# Patient Record
Sex: FEMALE | Race: WHITE | URBAN - METROPOLITAN AREA
[De-identification: names, ages, dates, MRNs, and addresses within clinical notes are randomized per-mention and may not be internally consistent; named-entity substitution may affect disease eponyms.]

---

## 2022-05-24 ENCOUNTER — HOSPITAL ENCOUNTER (OUTPATIENT)
Dept: PHYSICAL THERAPY | Age: 42
Discharge: HOME | End: 2022-05-24
Payer: COMMERCIAL

## 2022-05-24 PROCEDURE — 97535 SELF CARE MNGMENT TRAINING: CPT

## 2022-05-24 PROCEDURE — 97162 PT EVAL MOD COMPLEX 30 MIN: CPT

## 2022-05-24 PROCEDURE — 97110 THERAPEUTIC EXERCISES: CPT

## 2022-05-24 NOTE — PROGRESS NOTES
[facility] PHYSICAL THERAPY  [address] [place] [address] 51, Chester Orozco 201,[facility], [facility] - Phone: [phone]  Fax: [phone] / [facility]  Patient Name: Avi Rhodes : 1980   Medical   Diagnosis: Pain in right shoulder [M25.511] Treatment Diagnosis: R shoulder pain   Onset Date:      Referral Source: Sam Patel MD Start of Care [facility]): 2022   Prior Hospitalization: See medical history Provider #: [id number]   Prior Level of Function: History of R shoulder pain on and off since 2019   Comorbidities: Anxiety, arthritis, BMI >30, depression   Medications: Verified on Patient Summary List   The Plan of Care and following information is based on the information from the initial evaluation.   ===========================================================================================  Assessment / kaminski information:  Avi Rhodes is a 39 y.o. female with Dx: R shoulder pain starting in  when she was playing softball and noticed pain radiating down the arm; had scope done to \"clean things up\" which helped pain until 2019 until she noticed similar symptoms. Reports she stopped playing softball and the shoulder got better. Recently noticed pain in the shoulder again when her dog pulled her away. Notes pain with sleeping on her R side, leaning on the R side for prolonged periods, and with repetitive overhead exercises. Reports she works out weekly with crossfit and HIIT workouts but hasn't worked out since the recent injury. Denies recent imaging. Denies neck pain or HA. Pt rates pain as 6/10 max, 0/10 at best, 1/10 currently. Occupation: tech support  Objective: FOTO score = 57 (an established functional score where 100 = no disability). Posture: forward head, rounded shoulders, thoracic kyphosis. Palpation TTP along R biceps and bicipital groove, R levator, R UT, R posterior cuff.  Shoulder ROM Flexion R 153 (165 with pain) L 172, ABD R 180 L 180, Functional IR R T8 L T5, Functional ER R T6 L T6. Strength: Shoulder Flexion R 5/5 L 5/5, ABD R 4/5 with pain L 5/5, ER R 4-/5 with pain L 5/5, IR R 5/5 with pain L 5/5. Special Tests: (+) Empty can, (-) [employer], (-) biceps load 1 and 2  Current deficits include decreased R shoulder ROM and strength along with poor posture leading to difficulty with prolonged positions and ADLs. Pt will benefit from a comprehensive POC/HEP to address impairments and restore function in order to return to prior level of function and prevent secondary impairments.  ===========================================================================================  Eval Complexity: History HIGH Complexity :3+ comorbidities / personal factors will impact the outcome/ POC ;  Examination  HIGH Complexity : 4+ Standardized tests and measures addressing body structure, function, activity limitation and / or participation in recreation ; Presentation MEDIUM Complexity : Evolving with changing characteristics ;   Decision Making MEDIUM Complexity : FOTO score of 26-74; Overall Complexity MEDIUM  Problem List: pain affecting function, decrease ROM, decrease strength, decrease ADL/ functional abilitiies, decrease activity tolerance, decrease flexibility/ joint mobility, and decrease transfer abilities   Treatment Plan may include any combination of the following: Therapeutic exercise, Therapeutic activities, Neuromuscular re-education, Physical agent/modality, Gait/balance training, Manual therapy, Aquatic therapy, Patient education, Self Care training, Functional mobility training, and Home safety training  Patient / Family readiness to learn indicated by: asking questions, trying to perform skills, and interest  Persons(s) to be included in education: patient (P)  Barriers to Learning/Limitations: None  Measures taken, if barriers to learning:    Patient Goal (s): No surgery   Patient self reported health status: fair  Rehabilitation Potential: good   Short Term Goals: To be accomplished in  3  weeks:  1. Independent with HEP to progress to meet goals. 2. Pt to report decreased max pain levels less than 4/10 for improvement in ADLs.  Long Term Goals: To be accomplished in  6  weeks:  1. Improve FOTO score to 70/100 to show a significant functional change. 2. Pt to report decreased max pain levels less than 2/10 for improvement in QoL. 3. Pt to report return to gym with UE therex to resume PLOF. 4. Pt to report 75% improvement in sleeping to improve QoL. Frequency / Duration:   Patient to be seen  2  times per week for 6  weeks:  Patient / Caregiver education and instruction: self care, activity modification, and exercises  Therapist Signature: Lachelle Nunez PT , DPT Date: 6/99/2008   Certification Period: na Time: 12:50 PM   ===========================================================================================  I certify that the above Physical Therapy Services are being furnished while the patient is under my care. I agree with the treatment plan and certify that this therapy is necessary. Physician Signature:        Date:       Time:                                        Wilberto Thorne MD  Please sign and return to [facility] at [facility] [place], [facility]. or you may fax the signed copy to [phone]. Thank you.

## 2022-05-24 NOTE — PROGRESS NOTES
PHYSICAL THERAPY - DAILY TREATMENT NOTE    Patient Name: Jessica Aviles        Date: 2022  : 1980   yes Patient  Verified  Visit #:   1   of   12  Insurance: Payor: Lisa Meehan / Plan: [address] [place] [address] / Product Type: PPO /      In time: 605 Out time: 648   Total Treatment Time: 43     Medicare/[facility] Time Tracking (below)   Total Timed Codes (min):  25 1:1 Treatment Time:  43     TREATMENT AREA =  Pain in right shoulder [M25.511]    SUBJECTIVE  Pain Level (on 0 to 10 scale):  0  / 10   Medication Changes/New allergies or changes in medical history, any new surgeries or procedures? yes  If yes, update Summary List   Subjective Functional Status/Changes:  []  No changes reported     See POC          OBJECTIVE  12 min Therapeutic Exercise:  [x]  See flow sheet   Rationale:      increase strength and improve coordination to improve the patients ability to perform ADLs. 13 min Self Care: Pt education on HEP, POC, prognosis, and diagnosis. Rationale:    Improve pt understanding to improve the patients ability to progress therapy at home. Billed With/As:   [] TE   [] TA   [] Neuro   [x] Self Care Patient Education: [x] Review HEP    [] Progressed/Changed HEP based on:   [] positioning   [] body mechanics   [] transfers   [] heat/ice application    [] other:      Other Objective/Functional Measures:    Shown and performed HEP    Access Code: [email or web address]  URL: [email or web address]  Date: 2022  Prepared by: Heather General    Exercises  Seated Scapular Retraction - 5 x daily - 7 x weekly - 15 reps - 5sec hold  Prone Scapular Slide with Shoulder Extension - 2 x daily - 7 x weekly - 2 sets - 10 reps  Prone Shoulder Horizontal Abduction with Thumbs Up - 2 x daily - 7 x weekly - 2 sets - 10 reps  Sidelying Shoulder External Rotation - 2 x daily - 7 x weekly - 2 sets - 10 reps  Sidelying Shoulder Abduction Palm Forward - 2 x daily - 7 x weekly - 2 sets - 10 reps Post Treatment Pain Level (on 0 to 10) scale:   0  / 10     ASSESSMENT  Assessment/Changes in Function:     See POC     []  See Progress Note/Recertification   Patient will continue to benefit from skilled PT services to modify and progress therapeutic interventions, address functional mobility deficits, analyze and cue movement patterns, and analyze and modify body mechanics/ergonomics to attain remaining goals.    Progress toward goals / Updated goals:    See POC     PLAN  [x]  Upgrade activities as tolerated yes Continue plan of care   []  Discharge due to :    []  Other:      Therapist: Sreekanth Puga PT , DPT    Date: 5/24/2022 Time: 12:50 PM     Future Appointments   Date Time Provider Danyel Hatch   5/24/2022  6:00 PM Jesús Stone, [address] [facility]

## 2022-06-02 ENCOUNTER — HOSPITAL ENCOUNTER (OUTPATIENT)
Dept: PHYSICAL THERAPY | Age: 42
Discharge: HOME | End: 2022-06-02
Payer: COMMERCIAL

## 2022-06-02 PROCEDURE — 97535 SELF CARE MNGMENT TRAINING: CPT

## 2022-06-02 PROCEDURE — 97110 THERAPEUTIC EXERCISES: CPT

## 2022-06-02 PROCEDURE — 97140 MANUAL THERAPY 1/> REGIONS: CPT

## 2022-06-02 NOTE — PROGRESS NOTES
PHYSICAL THERAPY - DAILY TREATMENT NOTE    Patient Name: Robert Garcia        Date: 2022  : 1980   YES Patient  Verified  Visit #:   2  of   12  Insurance: Payor: Martha Canter / Plan: [address] [place] [address] / Product Type: PPO /      In time: 6:00 Out time: 6:55   Total Treatment Time: 55     Medicare/[employer] Time Tracking (below)   Total Timed Codes (min):  55 1:1 Treatment Time:  55     TREATMENT AREA =  Pain in right shoulder [M25.511]    SUBJECTIVE  Pain Level (on 0 to 10 scale):  2  / 10   Medication Changes/New allergies or changes in medical history, any new surgeries or procedures? NO If yes, update Summary List   Subjective Functional Status/Changes:  []  No changes reported     Says sort of did the HEP. Says she slept on the right shoulder and that made it achy. She can feel the pain down the front of the arm sometimes. She sits \"like a cooked shrimp\" at work so her posture is bad. OBJECTIVE    37 min Therapeutic Exercise:  [x]  See flow sheet   Rationale:      increase ROM, increase strength and improve coordination to improve the patients ability to push, pull, and stabilize the shoulder     8 min Manual Therapy: Light PROM in all planes   Rationale:      decrease pain and increase tissue extensibility to improve patient's ability to tolerate anterior shoulder tension  The manual therapy interventions were performed at a separate and distinct time from the therapeutic activities interventions. 10 min Self Care: HEP expansion, instruction/demo   Rationale:    increase strength to improve the patients ability to make gains btw visits    Billed With/As:   [] TE   [] TA   [] Neuro   [] Self Care Patient Education: [x] Review HEP    [] Progressed/Changed HEP based on:   [] Addressed barriers and behaviors     [] Therapeutic Neuroscience Pain Education, metaphor, reframing, contexts.     [] Sleep Education   [] Body Mechanics [] Healing Timeframe     [] Self STM with ball at [facility] spot\"  [] Global Activity   [] other:      Other Objective/Functional Measures:    See Flowsheet for drills, loads and volume. HEP:  Access Code: [email or web address]  URL: [email or web address]  Date: 06/02/2022  Prepared by: Nick Civil    Exercises  Isometric Shoulder Flexion at Wall - 1 x daily - 7 x weekly - 1 sets - 10 reps - 5 hold  Standing Isometric Shoulder Internal Rotation at Doorway - 1 x daily - 7 x weekly - 1 sets - 10 reps - 5 hold  Isometric Shoulder Extension at Wall - 1 x daily - 7 x weekly - 1 sets - 10 reps - 5 hold  Standing Isometric Shoulder External Rotation with Doorway - 1 x daily - 7 x weekly - 1 sets - 10 reps - 5 hold  Standing Isometric Shoulder Abduction with Doorway - Arm Bent - 1 x daily - 7 x weekly - 1 sets - 10 reps - 5 hold  Isometric Shoulder Adduction - 1 x daily - 7 x weekly - 1 sets - 10 reps - 5 hold       Post Treatment Pain Level (on 0 to 10) scale:   0  / 10     ASSESSMENT  Assessment/Changes in Function:     Chart reviewed and subjective taken. Pt required skilled instruction for initiation, form and follow-through for all drills in session. Pt responds well to instruction and demo. Noted biceps tendon irritation and loose right shoulder and isoms were added to HEP. []  See Progress Note/Recertification   Patient will continue to benefit from skilled PT services to modify and progress therapeutic interventions, address functional mobility deficits, address ROM deficits, address strength deficits, analyze and address soft tissue restrictions, analyze and cue movement patterns, analyze and modify body mechanics/ergonomics and assess and modify postural abnormalities to attain remaining goals. Progress toward goals / Updated goals: · Short Term Goals: To be accomplished in  3  weeks:  1. Independent with HEP to progress to meet goals. Non-compliant to date  2. Pt to report decreased max pain levels less than 4/10 for improvement in ADLs.   · Long Term Goals: To be accomplished in  6  weeks:  1. Improve FOTO score to 70/100 to show a significant functional change. 2. Pt to report decreased max pain levels less than 2/10 for improvement in QoL. 3. Pt to report return to gym with UE therex to resume PLOF. 4. Pt to report 75% improvement in sleeping to improve QoL.      PLAN  [x]  Upgrade activities as tolerated YES Continue plan of care   []  Discharge due to :    []  Other:      Therapist: Luis Flanagan PT    Date: 6/2/2022 Time: 6:48 PM     Future Appointments   Date Time Provider Danyel Hatch   6/13/2022  7:15 AM Garon Alpha,  [address] [facility]   6/21/2022  6:00 PM Nav Royal,  [address] [facility]   6/23/2022  6:00 PM Nav Royal,  [address] [facility]   6/28/2022  6:00 PM Nav Royal,  [address] [facility]   6/30/2022  6:00 PM Nav Royal,  [address] [facility]   7/11/2022  6:00 PM Garon Alpha,  [address] [facility]   7/12/2022  6:00 PM Ayesha Calloway,  [address] [facility]   7/18/2022  6:00 PM Garon Alpha,  [address] [facility]   7/21/2022  6:00 PM Nav Royal,  [address] [facility]   7/25/2022  6:00 PM Garon Alpha,  [address] [facility]   7/28/2022  6:00 PM Nav Royal,  [address] [facility]

## 2022-06-13 ENCOUNTER — HOSPITAL ENCOUNTER (OUTPATIENT)
Dept: PHYSICAL THERAPY | Age: 42
Discharge: HOME | End: 2022-06-13
Payer: COMMERCIAL

## 2022-06-13 PROCEDURE — 97140 MANUAL THERAPY 1/> REGIONS: CPT

## 2022-06-13 PROCEDURE — 97110 THERAPEUTIC EXERCISES: CPT

## 2022-06-13 NOTE — PROGRESS NOTES
PHYSICAL THERAPY - DAILY TREATMENT NOTE    Patient Name: Meghna Butler        Date: 2022  : 1980   yes Patient  Verified  Visit #:   3   of   12  Insurance: Payor: London Ovalle / Plan: [address] [place] [id number] / Product Type: PPO /      In time: 7:30 am Out time: 8:00 am   Total Treatment Time: 30     Medicare/[facility] Time Tracking (below)   Total Timed Codes (min):  30 1:1 Treatment Time:  30     TREATMENT AREA =  Pain in right shoulder [M25.511]    SUBJECTIVE  Pain Level (on 0 to 10 scale):  0  / 10   Medication Changes/New allergies or changes in medical history, any new surgeries or procedures?    no  If yes, update Summary List   Subjective Functional Status/Changes:  []  No changes reported     Patient reports no pain this morning and that she is not a morning person which is why she is late. OBJECTIVE  Modalities Rationale:     decrease inflammation and decrease pain to improve patient's ability to reach, lift and carry items.    min [] Estim, type/location:                                      []  att     []  unatt     []  w/US     []  w/ice    []  w/heat    min []  Mechanical Traction: type/lbs                   []  pro   []  sup   []  int   []  cont    []  before manual    []  after manual    min []  Ultrasound, settings/location:      min []  Iontophoresis w/ dexamethasone, location:                                               []  take home patch       []  in clinic   PD min []  Ice     []  Heat    location/position:     min []  Vasopneumatic Device, press/temp:    If using vaso (only need to measure limb vaso being performed on)      pre-treatment girth :       post-treatment girth :       measured at (landmark location) :      min []  Other:    [x] Skin assessment post-treatment (if applicable):    [x]  intact    []  redness- no adverse reaction                  []redness - adverse reaction:      15 min Therapeutic Exercise:  [x]  See flow sheet   Rationale:      increase ROM and increase strength to improve the patients ability to reach, lift and carry     15 min Manual Therapy: Gentle PROM/stretching   Rationale:      decrease pain, increase ROM and increase tissue extensibility to improve patient's ability to reach, lift and carry  The manual therapy interventions were performed at a separate and distinct time from the therapeutic activities interventions. Billed With/As:   [x] TE   [] TA   [] Neuro   [] Self Care Patient Education: [x] Review HEP    [] Progressed/Changed HEP based on:   [] positioning   [] body mechanics   [] transfers   [] heat/ice application    [] other:      Other Objective/Functional Measures:    Palpation: biceps tendon and pec major tendon moderately TTP with mTrp located in pec major insertion. Ischemic compression provided with gentle passive ROM    Therex and manual therapies per flow sheet. Post Treatment Pain Level (on 0 to 10) scale:   0  / 10     ASSESSMENT  Assessment/Changes in Function:     Patient tolerated today's treatment well. Progressed/added therex as follows: 2# for prone ext and T, s/l ER and ABD. Discussed progressions with patient. Patient is agreeable. Continue to progress as tolerated. []  See Progress Note/Recertification   Patient will continue to benefit from skilled PT services to modify and progress therapeutic interventions, address functional mobility deficits, address ROM deficits, address strength deficits, analyze and address soft tissue restrictions, analyze and cue movement patterns, analyze and modify body mechanics/ergonomics and assess and modify postural abnormalities to attain remaining goals. Progress toward goals / Updated goals:    Patient making good progress towards all goals at this time. Minimal progress in 3 visits. Pain is diminished. All goals remain applicable.      PLAN  [x]  Upgrade activities as tolerated yes Continue plan of care   []  Discharge due to :    []  Other:      Therapist: Emilee Casas Jaimie Dixon, PT    Date: 6/13/2022 Time: 7:06 AM     Future Appointments   Date Time Provider Danyel Hatch   6/13/2022  7:15 AM Carisa Hoskins, PT [facility]   6/21/2022  6:00 PM Placido Jaeger, PT [facility]   6/23/2022  6:00 PM Placido Jaeger, PT [facility]   6/28/2022  6:00 PM Placido Jaeger, PT [facility]   6/30/2022  6:00 PM Placido Jaeger, PT [facility]   7/11/2022  6:00 PM Carisa Hoskins, PT [facility]   7/12/2022  6:00 PM Jf Smith, PT [facility]   7/18/2022  6:00 PM Carisa Hoskins, PT [facility]   7/21/2022  6:00 PM Placido Jaeger, PT [facility]   7/25/2022  6:00 PM Carisa Hoskins, PT [facility]   7/28/2022  6:00 PM Placido Jaeger, PT [facility]

## 2022-06-21 ENCOUNTER — HOSPITAL ENCOUNTER (OUTPATIENT)
Dept: PHYSICAL THERAPY | Age: 42
Discharge: HOME | End: 2022-06-21
Payer: COMMERCIAL

## 2022-06-21 PROCEDURE — 97140 MANUAL THERAPY 1/> REGIONS: CPT

## 2022-06-21 PROCEDURE — 97110 THERAPEUTIC EXERCISES: CPT

## 2022-06-21 NOTE — PROGRESS NOTES
PHYSICAL THERAPY - DAILY TREATMENT NOTE    Patient Name: Tayler العراقي        Date: 2022  : 1980   yes Patient  Verified  Visit #:      of   12  Insurance: Payor: Erika Baker / Plan: [address] [place] [address] / Product Type: PPO /      In time: 6:02 Out time: 6:46   Total Treatment Time: 44     Medicare/[facility] Time Tracking (below)   Total Timed Codes (min):  44 1:1 Treatment Time:  44     TREATMENT AREA =  Pain in right shoulder [M25.511]    SUBJECTIVE  Pain Level (on 0 to 10 scale):  0  / 10   Medication Changes/New allergies or changes in medical history, any new surgeries or procedures?    no  If yes, update Summary List   Subjective Functional Status/Changes:  []  No changes reported     Pain with sleeping and leaning on her R arm. Been trying to walk her dog with her L arm which has helped. Intermittent compliance with HEP. OBJECTIVE  19 min Therapeutic Exercise:  [x]  See flow sheet   Rationale:      increase ROM and increase strength to improve the patients ability to reach, lift and carry     25 min Manual Therapy: TPR along pec, CFM along biceps, PROM   Rationale:      decrease pain, increase ROM and increase tissue extensibility to improve patient's ability to reach, lift and carry  The manual therapy interventions were performed at a separate and distinct time from the therapeutic activities interventions. Billed With/As:   [x] TE   [] TA   [] Neuro   [] Self Care Patient Education: [x] Review HEP    [] Progressed/Changed HEP based on:   [] positioning   [] body mechanics   [] transfers   [] heat/ice application    [] other:      Other Objective/Functional Measures:    See flow sheet for exercises performed. Post Treatment Pain Level (on 0 to 10) scale:   0  / 10     ASSESSMENT  Assessment/Changes in Function:     MT along biceps and pec f/b eccentric pec fly. Pt tolerated session well. Will continue as tolerated.      []  See Progress Note/Recertification   Patient will continue to benefit from skilled PT services to modify and progress therapeutic interventions, address functional mobility deficits, address ROM deficits, address strength deficits, analyze and address soft tissue restrictions, analyze and cue movement patterns, analyze and modify body mechanics/ergonomics and assess and modify postural abnormalities to attain remaining goals.    Progress toward goals / Updated goals:    Progress with pain goal.     PLAN  [x]  Upgrade activities as tolerated yes Continue plan of care   []  Discharge due to :    []  Other:      Therapist: Malinda Wagner PT, DPT    Date: 6/21/2022 Time: 7:06 PM     Future Appointments   Date Time Provider Danyel Hatch   6/21/2022  6:00 PM Tonya Figueroa, PT [place] [facility]   6/23/2022  6:00 PM Tonya Figueroa, PT [facility]   6/28/2022  6:00 PM Tonya Figueroa, PT [facility]   6/30/2022  6:00 PM Tonya Figueroa, PT [facility]   7/11/2022  6:00 PM Joselin Lee, [address] [facility]   7/12/2022  6:00 PM Latonia Ziegler, PT [facility]   7/18/2022  6:00 PM Joselin Lee, PT [facility]   7/21/2022  6:00 PM Tonya Figueroa, PT [facility]   7/25/2022  6:00 PM Joselin Lee, [address] [facility]   7/28/2022  6:00 PM Tonya Figueroa, PT [facility]

## 2022-06-23 ENCOUNTER — HOSPITAL ENCOUNTER (OUTPATIENT)
Dept: PHYSICAL THERAPY | Age: 42
Discharge: HOME | End: 2022-06-23
Payer: COMMERCIAL

## 2022-06-23 PROCEDURE — 97140 MANUAL THERAPY 1/> REGIONS: CPT

## 2022-06-23 PROCEDURE — 97110 THERAPEUTIC EXERCISES: CPT

## 2022-06-23 NOTE — PROGRESS NOTES
PHYSICAL THERAPY - DAILY TREATMENT NOTE    Patient Name: Marce Michel        Date: 2022  : 1980   yes Patient  Verified  Visit #:      12  Insurance: Payor: Alex Campbell / Plan: [address] [place] [address] / Product Type: PPO /      In time: 6:04 Out time: 6:47   Total Treatment Time: 44     Medicare/[employer] Time Tracking (below)   Total Timed Codes (min):  44 1:1 Treatment Time:  44     TREATMENT AREA =  Pain in right shoulder [M25.511]    SUBJECTIVE  Pain Level (on 0 to 10 scale):  0  / 10   Medication Changes/New allergies or changes in medical history, any new surgeries or procedures?    no  If yes, update Summary List   Subjective Functional Status/Changes:  []  No changes reported     Notes no changes. Sore in the R pec after last session. OBJECTIVE  30 min Therapeutic Exercise:  [x]  See flow sheet   Rationale:      increase ROM and increase strength to improve the patients ability to reach, lift and carry     14 min Manual Therapy: TPR along pec, PROM   Rationale:      decrease pain, increase ROM and increase tissue extensibility to improve patient's ability to reach, lift and carry  The manual therapy interventions were performed at a separate and distinct time from the therapeutic activities interventions. Billed With/As:   [x] TE   [] TA   [] Neuro   [] Self Care Patient Education: [x] Review HEP    [] Progressed/Changed HEP based on:   [] positioning   [] body mechanics   [] transfers   [] heat/ice application    [] other:      Other Objective/Functional Measures:    See flow sheet for exercises performed.    Post Treatment Pain Level (on 0 to 10) scale:   0  / 10     ASSESSMENT  Assessment/Changes in Function:     See PN     [x]  See Progress Note/Recertification   Patient will continue to benefit from skilled PT services to modify and progress therapeutic interventions, address functional mobility deficits, address ROM deficits, address strength deficits, analyze and address soft tissue restrictions, analyze and cue movement patterns, analyze and modify body mechanics/ergonomics and assess and modify postural abnormalities to attain remaining goals.    Progress toward goals / Updated goals:    See PN     PLAN  [x]  Upgrade activities as tolerated yes Continue plan of care   []  Discharge due to :    []  Other:      Therapist: Blossom Marquis PT, DPT    Date: 6/23/2022 Time: 7:06 PM     Future Appointments   Date Time Provider Danyel Hatch   6/23/2022  6:00 PM Saulo Bourgeois, PT [facility]   6/28/2022  6:00 PM Saulo Bourgeois, PT [facility]   6/30/2022  6:00 PM Saulo Bourgeois PT [facility]   7/11/2022  6:00 PM Zari Morillo, PT [facility]   7/12/2022  6:00 PM Roscoe Singh, PT [facility]   7/18/2022  6:00 PM Zari Morillo, PT [facility]   7/21/2022  6:00 PM Saulo Bourgeois, PT [facility]   7/25/2022  6:00 PM Zari Morillo, [address] [place] [address] [facility]   7/28/2022  6:00 PM Saulo Bourgeois, PT [id number]

## 2022-06-23 NOTE — PROGRESS NOTES
[facility] PHYSICAL THERAPY  [address] [place] [address] Angela Haque [address],[facility], [facility] - Phone: [phone]  Fax: [phone]  PROGRESS NOTE  Patient Name: Deven Hartmann : 1980   Treatment/Medical Diagnosis: Pain in right shoulder [M25.511]   Referral Source: Hollie Trejo MD     Date of Initial Visit: 22 Attended Visits: 5 Missed Visits: 0     SUMMARY OF TREATMENT  Pt was seen for IE and 4 f/u visits with treatment consisting of manual therapy, therapeutic exercises, therapeutic activities, neuromuscular reeducation, and self care techniques to improve R shoulder stability and strength along with instruction of HEP. CURRENT STATUS  Pt has made little progress in PT thus far likely due to limited appointments. Notes max pain levels at 3/10 and average pain level of 2/10. Pt report 25% improvement since beginning therapy. Pt notes she has been trying to rest the shoulder as much as possible, however reports little pain with carrying her suitcase and less intense pain with over head reach. Pt notes they are completing their HEP at least once a day. Pt will continue to benefit from skilled PT to progress exercises and improve upon R shoulder stability and ROM. Goal/Measure of Progress Goal Met? 1. Independent with HEP to progress to meet goals. Status at last Eval: Goal established Current Status: compliant yes   2. Pt to report decreased max pain levels less than 4/10 for improvement in ADLs. Status at last Eval: 6/10 Current Status: 3 yes   3. Improve FOTO score to 70/100 to show a significant functional change. Status at last Eval: 62 Current Status: NT n/a     New Goals to be achieved in __4__  weeks:  1. Pt to report decreased max pain levels less than 2/10 for improvement in QoL. 2. Pt to report return to gym with UE therex to resume PLOF. 3. Pt to report 75% improvement in sleeping to improve QoL.     RECOMMENDATIONS  Continue with therapy 2x/week for 4 weeks to further improve upon R shoulder stability, strength, and functional activities. Please advise. Thank you. If you have any questions/comments please contact us directly at [phone]. Thank you for allowing us to assist in the care of your patient. Therapist Signature: Esperanza Dickinson PT, DPT Date: 6/23/2022     Time: 10:08 AM   NOTE TO PHYSICIAN:  PLEASE COMPLETE THE ORDERS BELOW AND FAX TO   [facility] Physical Therapy: [phone]  If you are unable to process this request in 24 hours please contact our office: [phone]    ___ I have read the above report and request that my patient continue as recommended.   ___ I have read the above report and request that my patient continue therapy with the following changes/special instructions:_________________________________________________________   ___ I have read the above report and request that my patient be discharged from therapy.      Physician Signature:        Date:       Time:                                 Alicia Foley MD

## 2022-06-28 ENCOUNTER — HOSPITAL ENCOUNTER (OUTPATIENT)
Dept: PHYSICAL THERAPY | Age: 42
Discharge: HOME | End: 2022-06-28
Payer: COMMERCIAL

## 2022-06-28 PROCEDURE — 97110 THERAPEUTIC EXERCISES: CPT

## 2022-06-28 PROCEDURE — 97112 NEUROMUSCULAR REEDUCATION: CPT

## 2022-06-28 PROCEDURE — 97140 MANUAL THERAPY 1/> REGIONS: CPT

## 2022-06-28 NOTE — PROGRESS NOTES
PHYSICAL THERAPY - DAILY TREATMENT NOTE    Patient Name: Karina Bello        Date: 2022  : 1980   yes Patient  Verified  Visit #:   6      12  Insurance: Payor: Chanda Mathew / Plan: [address] [place] [address] / Product Type: PPO /      In time: 6:04 Out time: 6:47   Total Treatment Time: 44     Medicare/[facility] Time Tracking (below)   Total Timed Codes (min):  44 1:1 Treatment Time:  44     TREATMENT AREA =  Pain in right shoulder [M25.511]    SUBJECTIVE  Pain Level (on 0 to 10 scale):  0  / 10   Medication Changes/New allergies or changes in medical history, any new surgeries or procedures?    no  If yes, update Summary List   Subjective Functional Status/Changes:  []  No changes reported     No changes since LV. Shoulder feels good. Worked out on  and did some modified exercises. OBJECTIVE  24 min Therapeutic Exercise:  [x]  See flow sheet   Rationale:      increase ROM and increase strength to improve the patients ability to reach, lift and carry     10 min Manual Therapy: TPR along pec, PROM   Rationale:      decrease pain, increase ROM and increase tissue extensibility to improve patient's ability to reach, lift and carry  The manual therapy interventions were performed at a separate and distinct time from the therapeutic activities interventions. 10 min Neuromuscular Re-ed: [x]  See flow sheet   Rationale:    increase strength and improve coordination to improve the patients ability to recruit LT for imporved posture. Billed With/As:   [x] TE   [] TA   [] Neuro   [] Self Care Patient Education: [x] Review HEP    [] Progressed/Changed HEP based on:   [] positioning   [] body mechanics   [] transfers   [] heat/ice application    [] other:      Other Objective/Functional Measures:    See flow sheet for exercises performed. Post Treatment Pain Level (on 0 to 10) scale:   0  / 10     ASSESSMENT  Assessment/Changes in Function:     No complaints or adverse reactions with session. Added Tband IR/ER. Continue with POC. []  See Progress Note/Recertification   Patient will continue to benefit from skilled PT services to modify and progress therapeutic interventions, address functional mobility deficits, address ROM deficits, address strength deficits, analyze and address soft tissue restrictions, analyze and cue movement patterns, analyze and modify body mechanics/ergonomics and assess and modify postural abnormalities to attain remaining goals. Progress toward goals / Updated goals:    1. Pt to report decreased max pain levels less than 2/10 for improvement in QoL. 2. Pt to report return to gym with UE therex to resume PLOF. Modified workouts 6/28/22  3.  Pt to report 75% improvement in sleeping to improve QoL.        PLAN  [x]  Upgrade activities as tolerated yes Continue plan of care   []  Discharge due to :    []  Other:      Therapist: Elizabeth Garcia, PT, DPT    Date: 6/28/2022 Time: 7:06 PM     Future Appointments   Date Time Provider Danyel Hatch   6/28/2022  6:00 PM Wil Eaton, PT [facility]   6/30/2022  6:00 PM Wil Eaton, PT [facility]   7/11/2022  6:00 PM Karen James, PT [facility]   7/12/2022  6:00 PM Christina Grossman, PT [facility]   7/18/2022  6:00 PM Karen James, PT [facility]   7/21/2022  6:00 PM Wil Eaton, PT [facility]   7/25/2022  6:00 PM Karen James, [address]  [facility]   7/28/2022  6:00 PM Wil Eaton, PT [id number]

## 2022-06-30 ENCOUNTER — HOSPITAL ENCOUNTER (OUTPATIENT)
Dept: PHYSICAL THERAPY | Age: 42
Discharge: HOME | End: 2022-06-30
Payer: COMMERCIAL

## 2022-06-30 PROCEDURE — 97535 SELF CARE MNGMENT TRAINING: CPT

## 2022-06-30 PROCEDURE — 97110 THERAPEUTIC EXERCISES: CPT

## 2022-06-30 PROCEDURE — 97112 NEUROMUSCULAR REEDUCATION: CPT

## 2022-06-30 NOTE — PROGRESS NOTES
PHYSICAL THERAPY - DAILY TREATMENT NOTE    Patient Name: Jono Caro        Date: 2022  : 1980   yes Patient  Verified  Visit #:     Insurance: Payor: Octavia Sylvester / Plan: [address] [place] [address] / Product Type: PPO /      In time: 6:04 Out time: 6:50   Total Treatment Time: 46     Medicare/[employer] Time Tracking (below)   Total Timed Codes (min):  46 1:1 Treatment Time:  46     TREATMENT AREA =  Pain in right shoulder [M25.511]    SUBJECTIVE  Pain Level (on 0 to 10 scale):  0  / 10   Medication Changes/New allergies or changes in medical history, any new surgeries or procedures?    no  If yes, update Summary List   Subjective Functional Status/Changes:  []  No changes reported     Not as achey when she woke up this morning. Has been walking her dog in her left hand so he doesn't pull on the R shoulder. OBJECTIVE  26 min Therapeutic Exercise:  [x]  See flow sheet   Rationale:      increase ROM and increase strength to improve the patients ability to reach, lift and carry     ND min Manual Therapy: TPR along pec, PROM   Rationale:      decrease pain, increase ROM and increase tissue extensibility to improve patient's ability to reach, lift and carry  The manual therapy interventions were performed at a separate and distinct time from the therapeutic activities interventions. 10 min Neuromuscular Re-ed: [x]  See flow sheet   Rationale:    increase strength and improve coordination to improve the patients ability to recruit LT for imporved posture. 10 min Self Care: Discussion about joining a gym and consistent PA for improved well being. Rationale:    Pt education to improve the patients ability to progress PT.     Billed With/As:   [x] TE   [] TA   [] Neuro   [] Self Care Patient Education: [x] Review HEP    [] Progressed/Changed HEP based on:   [] positioning   [] body mechanics   [] transfers   [] heat/ice application    [] other:      Other Objective/Functional Measures:    See flow sheet for exercises performed. Post Treatment Pain Level (on 0 to 10) scale:   0  / 10     ASSESSMENT  Assessment/Changes in Function:     Improvement in symptoms thus far. No adverse reactions with treatment. Will continue to progress as tolerated. []  See Progress Note/Recertification   Patient will continue to benefit from skilled PT services to modify and progress therapeutic interventions, address functional mobility deficits, address ROM deficits, address strength deficits, analyze and address soft tissue restrictions, analyze and cue movement patterns, analyze and modify body mechanics/ergonomics and assess and modify postural abnormalities to attain remaining goals. Progress toward goals / Updated goals:    1. Pt to report decreased max pain levels less than 2/10 for improvement in QoL. 2. Pt to report return to gym with UE therex to resume PLOF. Modified workouts 6/28/22  3. Pt to report 75% improvement in sleeping to improve QoL.  Progressing slowly 6/30/22        PLAN  [x]  Upgrade activities as tolerated yes Continue plan of care   []  Discharge due to :    []  Other:      Therapist: Ayesha Wei PT, DPT    Date: 6/30/2022 Time: 7:06 PM     Future Appointments   Date Time Provider Danyel Hatch   6/30/2022  6:00 PM Jany Luna, PT [facility]   7/11/2022  6:00 PM Mnio Sharp, PT [facility]   7/12/2022  6:00 PM Mechelle Coleman, PT [facility]   7/18/2022  6:00 PM Mino Sharp, PT [facility]   7/21/2022  6:00 PM Jany Luna, PT [facility]   7/25/2022  6:00 PM Mino Sharp, PT [facility]   7/28/2022  6:00 PM Jany Luna, PT [facility]

## 2022-07-11 ENCOUNTER — HOSPITAL ENCOUNTER (OUTPATIENT)
Dept: PHYSICAL THERAPY | Age: 42
Discharge: HOME | End: 2022-07-11
Payer: COMMERCIAL

## 2022-07-11 PROCEDURE — 97112 NEUROMUSCULAR REEDUCATION: CPT

## 2022-07-11 PROCEDURE — 97110 THERAPEUTIC EXERCISES: CPT

## 2022-07-11 PROCEDURE — 97530 THERAPEUTIC ACTIVITIES: CPT

## 2022-07-11 NOTE — PROGRESS NOTES
PHYSICAL THERAPY - DAILY TREATMENT NOTE    Patient Name: Martina Rojas        Date: 2022  : 1980   yes Patient  Verified  Visit #:     Insurance: Payor: Sam Macias / Plan: [address] [place] [address] / Product Type: PPO /      In time: 6:00 pm Out time: 6:45 pm   Total Treatment Time: 45     Medicare/[facility] Time Tracking (below)   Total Timed Codes (min):  45 1:1 Treatment Time:  45     TREATMENT AREA =  Pain in right shoulder [M25.511]    SUBJECTIVE  Pain Level (on 0 to 10 scale):  0  / 10   Medication Changes/New allergies or changes in medical history, any new surgeries or procedures?    no  If yes, update Summary List   Subjective Functional Status/Changes:  []  No changes reported     Patient reports feeling pretty good. She only feels discomfort at end range ER at max flexion and with cross body adduction. OBJECTIVE  15 min Therapeutic Exercise:  [x]  See flow sheet   Rationale:      increase ROM and increase strength to improve the patients ability to reach, lift and carry items. 15 min Therapeutic Activity: [x]  See flow sheet   Rationale:    increase ROM, increase strength and improve coordination to improve the patients ability to reach, lift and carry items. 15 min Neuromuscular Re-ed: [x]  See flow sheet   Rationale:    increase strength, improve coordination and increase proprioception to improve the patients ability to reach, lift and carry items. Billed With/As:   [x] TE   [] TA   [] Neuro   [] Self Care Patient Education: [x] Review HEP    [] Progressed/Changed HEP based on:   [] positioning   [] body mechanics   [] transfers   [] heat/ice application    [] other:      Other Objective/Functional Measures:    Patient with noted ER weakness with sidelying ER and and band ER. Good form with all therex requiring minimal VCs. Therex and NMR as per flow sheet.    Post Treatment Pain Level (on 0 to 10) scale:   0  / 10     ASSESSMENT  Assessment/Changes in Function:     Patient tolerated today's treatment well. Progressed/added therex as follows: Increased reps with prone scap squeezes, decreased rep speed for increased difficulty. Discussed progress with patient. Patient is agreeable. Continue to progress as tolerated. []  See Progress Note/Recertification   Patient will continue to benefit from skilled PT services to modify and progress therapeutic interventions, address functional mobility deficits, address ROM deficits, address strength deficits, analyze and address soft tissue restrictions, analyze and cue movement patterns, analyze and modify body mechanics/ergonomics and assess and modify postural abnormalities to attain remaining goals. Progress toward goals / Updated goals:    Patient making good progress towards all goals at this time. Pain remains low. Discomfort at extreme end ranges and with OP. Strength is much more functional at this point with remaining weakness in external rotators. All goals remain applicable.      PLAN  [x]  Upgrade activities as tolerated yes Continue plan of care   []  Discharge due to :    []  Other:      Therapist: Ronda Eng PT    Date: 7/11/2022 Time: 7:04 AM     Future Appointments   Date Time Provider Danyel Hatch   7/11/2022  6:00 PM Rocio Sykes, PT [facility]   7/12/2022  6:00 PM Enid Duarte, PT [facility]   7/18/2022  6:00 PM Rocio Sykes, PT [facility]   7/21/2022  6:00 PM Patel Dawson, PT [facility]   7/25/2022  6:00 PM Rocio Sykes, [address]  [facility]   7/28/2022  6:00 PM Patel Dawson, [address] [facility]

## 2022-07-12 ENCOUNTER — HOSPITAL ENCOUNTER (OUTPATIENT)
Dept: PHYSICAL THERAPY | Age: 42
Discharge: HOME | End: 2022-07-12
Payer: COMMERCIAL

## 2022-07-12 PROCEDURE — 97140 MANUAL THERAPY 1/> REGIONS: CPT

## 2022-07-12 PROCEDURE — 97110 THERAPEUTIC EXERCISES: CPT

## 2022-07-12 PROCEDURE — 97112 NEUROMUSCULAR REEDUCATION: CPT

## 2022-07-12 NOTE — PROGRESS NOTES
PHYSICAL THERAPY - DAILY TREATMENT NOTE    Patient Name: Nancie Ladd        Date: 2022  : 1980   yes Patient  Verified  Visit #:     Insurance: Payor: Ce Velasquez / Plan: [address] [place] [address] / Product Type: PPO /      In time: 6:00 pm Out time: 6:50   Total Treatment Time: 50     Medicare/[employer] Time Tracking (below)   Total Timed Codes (min):  50 1:1 Treatment Time:  50     TREATMENT AREA =  Pain in right shoulder [M25.511]    SUBJECTIVE  Pain Level (on 0 to 10 scale):  0  / 10   Medication Changes/New allergies or changes in medical history, any new surgeries or procedures?    no  If yes, update Summary List   Subjective Functional Status/Changes:  []  No changes reported     Pt stated, \"I don't have that nagging pain anymore, I've been doing more lately but I know I favor my left side. One thing that bothers me still is sleeping on my right side, it's pretty sore when I wake up. \"         OBJECTIVE  25 min Therapeutic Exercise:  [x]  See flow sheet   Rationale:      increase ROM and increase strength to improve the patients ability to reach, lift and carry items. 10 min Manual Therapy: Infraspinatus TrP   Rationale:      decrease pain and increase ROM to improve patient's ability to be able to complete ADL's   The manual therapy interventions were performed at a separate and distinct time from the therapeutic activities interventions. 15 min Neuromuscular Re-ed: [x]  See flow sheet   Rationale:    increase strength, improve coordination and increase proprioception to improve the patients ability to reach, lift and carry items.       Billed With/As:   [x] TE   [] TA   [] Neuro   [] Self Care Patient Education: [x] Review HEP    [] Progressed/Changed HEP based on:   [] positioning   [] body mechanics   [] transfers   [] heat/ice application    [] other:      Other Objective/Functional Measures:    R shoulder AROM: WNL and full in all planes    R UE gross strength: 4+/5 in all directions except 3+/5 with ER at 0 deg  Reports familiar feeling at the shoulder with IR at 0 deg strength testing    Special tests:  (-) biceps load, speeds, load and shift  (+) Crossover impingement pointing to the [facility] [place] joint    TTP to the muscle belly and tendon of R infraspinatus; reports pain shooting to the superior shoulder stating \"that's the same familiar pain from before\"     Post Treatment Pain Level (on 0 to 10) scale:   0  / 10     ASSESSMENT  Assessment/Changes in Function:     Overall improvement with pain, AROM, and strength in all planes. Pt presents with infraspinatus tendinopathy like pain as she is TTP and weak with ER. Will continue to progress POC and focus on ER and single arm strengthening. []  See Progress Note/Recertification   Patient will continue to benefit from skilled PT services to modify and progress therapeutic interventions, address functional mobility deficits, address ROM deficits, address strength deficits, analyze and address soft tissue restrictions, analyze and cue movement patterns, analyze and modify body mechanics/ergonomics and assess and modify postural abnormalities to attain remaining goals. Progress toward goals / Updated goals:    New Goals to be achieved in __4__  weeks:  1. Pt to report decreased max pain levels less than 2/10 for improvement in QoL. 2. Pt to report return to gym with UE therex to resume PLOF. 3. Pt to report 75% improvement in sleeping to improve QoL.        PLAN  [x]  Upgrade activities as tolerated yes Continue plan of care   []  Discharge due to :    []  Other:      Therapist: Carolina Dickerson, SPT   Jeannette Mcginnis, PT DPT [facility]    Date: 7/12/2022 Time: 7:04 AM     Future Appointments   Date Time Provider Danyel Hatch   7/12/2022  6:00 PM Enid Duarte, PT [facility]   7/18/2022  6:00 PM Rocio Sykes, PT [facility]   7/21/2022  6:00 PM Patel Dawson, PT [facility]   7/25/2022  6:00 PM Rocio Sykes, PT [facility]   7/28/2022  6:00 PM Gabrielle Shore, PT [facility]

## 2022-07-18 ENCOUNTER — HOSPITAL ENCOUNTER (OUTPATIENT)
Dept: PHYSICAL THERAPY | Age: 42
Discharge: HOME | End: 2022-07-18
Payer: COMMERCIAL

## 2022-07-18 PROCEDURE — 97110 THERAPEUTIC EXERCISES: CPT

## 2022-07-18 PROCEDURE — 97140 MANUAL THERAPY 1/> REGIONS: CPT

## 2022-07-18 PROCEDURE — 97112 NEUROMUSCULAR REEDUCATION: CPT

## 2022-07-18 NOTE — PROGRESS NOTES
PHYSICAL THERAPY - DAILY TREATMENT NOTE    Patient Name: Matt Mejias        Date: 2022  : 1980   yes Patient  Verified  Visit #:   10   of   12  Insurance: Payor: Gerson Vu / Plan: [address] [place] [address] / Product Type: PPO /      In time: 6:15 pm Out time: 7:00 pm   Total Treatment Time: 45     Medicare/[employer] Time Tracking (below)   Total Timed Codes (min):  40 1:1 Treatment Time:  40     TREATMENT AREA =  Pain in right shoulder [M25.511]    SUBJECTIVE  Pain Level (on 0 to 10 scale):  0  / 10   Medication Changes/New allergies or changes in medical history, any new surgeries or procedures?    no  If yes, update Summary List   Subjective Functional Status/Changes:  []  No changes reported     Patient reports feeling pretty good. She states that the PT last visit dug into the back of her shoulder and it hurt pretty bad. OBJECTIVE  Modalities Rationale:     decrease inflammation and decrease pain to improve patient's ability to maintain upright posture, reach, lift and carry.    min [] Estim, type/location:                                      []  att     []  unatt     []  w/US     []  w/ice    []  w/heat    min []  Mechanical Traction: type/lbs                   []  pro   []  sup   []  int   []  cont    []  before manual    []  after manual    min []  Ultrasound, settings/location:      min []  Iontophoresis w/ dexamethasone, location:                                               []  take home patch       []  in clinic   5 min [x]  Ice     []  Heat    location/position: Posterior cuff supine    min []  Vasopneumatic Device, press/temp:    If using vaso (only need to measure limb vaso being performed on)      pre-treatment girth :       post-treatment girth :       measured at (landmark location) :      min []  Other:    [x] Skin assessment post-treatment (if applicable):    [x]  intact    []  redness- no adverse reaction                  []redness - adverse reaction:      15 min Therapeutic Exercise:  [x]  See flow sheet   Rationale:      increase ROM and increase strength to improve the patients ability to maintain upright posture, reach, lift and carry. 10 min Manual Therapy: DTM to posterior cuff and lats   Rationale:      decrease pain, increase ROM and increase tissue extensibility to improve patient's ability to maintain upright posture, reach, lift and carry. The manual therapy interventions were performed at a separate and distinct time from the therapeutic activities interventions. 15 min Neuromuscular Re-ed: [x]  See flow sheet   Rationale:    increase strength, improve coordination and increase proprioception to improve the patients ability to maintain upright posture, reach, lift and carry. Billed With/As:   [x] TE   [] TA   [] Neuro   [] Self Care Patient Education: [x] Review HEP    [] Progressed/Changed HEP based on:   [] positioning   [] body mechanics   [] transfers   [] heat/ice application    [] other:      Other Objective/Functional Measures:    Palpation: TrPs noted along infraspinatus and teres minor. Lats mildly tight/restricted. TCs for form with scap retraction. Therex and NMR as per flow sheet. Post Treatment Pain Level (on 0 to 10) scale:   0  / 10     ASSESSMENT  Assessment/Changes in Function:     Patient tolerated today's treatment well. Progressed/added therex as follows: prone rows and scap retractions with DB, lat pull downs, CC Ir/ER, light weight OH press. Discussed progress with patient. Patient is agreeable. Continue to progress as tolerated.      []  See Progress Note/Recertification   Patient will continue to benefit from skilled PT services to modify and progress therapeutic interventions, address functional mobility deficits, address ROM deficits, address strength deficits, analyze and address soft tissue restrictions, analyze and cue movement patterns, analyze and modify body mechanics/ergonomics and assess and modify postural abnormalities to attain remaining goals. Progress toward goals / Updated goals:    Patient making good progress towards all goals at this time. Pain remains low to none. Strength slowly improving. Progressed to single arm therex. All goals remain applicable.      PLAN  [x]  Upgrade activities as tolerated yes Continue plan of care   []  Discharge due to :    []  Other:      Therapist: Beryle Laurel, PT    Date: 7/18/2022 Time: 6:43 AM     Future Appointments   Date Time Provider Danyel Hatch   7/18/2022  6:00 PM Gurpreet Carballo, PT [facility]   7/21/2022  6:00 PM Zahira Villegas, PT [facility]   7/25/2022  6:00 PM Gurpreet Carballo, [address] [facility]   7/28/2022  6:00 PM Zahira Amaroeper, [address] [facility]

## 2022-07-21 ENCOUNTER — HOSPITAL ENCOUNTER (OUTPATIENT)
Dept: PHYSICAL THERAPY | Age: 42
Discharge: HOME | End: 2022-07-21
Payer: COMMERCIAL

## 2022-07-21 PROCEDURE — 97535 SELF CARE MNGMENT TRAINING: CPT

## 2022-07-21 PROCEDURE — 97140 MANUAL THERAPY 1/> REGIONS: CPT

## 2022-07-21 PROCEDURE — 97110 THERAPEUTIC EXERCISES: CPT

## 2022-07-21 NOTE — PROGRESS NOTES
[address] [facility] PHYSICAL THERAPY  [address] [facility] [address],[place], [address] [facility] [address] - Phone: [phone]  Fax: [phone]  PROGRESS NOTE  Patient Name: Kamille Jones : 1980   Treatment/Medical Diagnosis: Pain in right shoulder [M25.511]   Referral Source: Jaye Lobo MD     Date of Initial Visit: 22 Attended Visits: 11 Missed Visits: 0     SUMMARY OF TREATMENT  Pt was seen for IE and 10 f/u visits with treatment consisting of manual therapy, therapeutic exercises, therapeutic activities, neuromuscular reeducation, and self care techniques to improve R shoulder stability and strength along with instruction of HEP. CURRENT STATUS  Pt has made gradual progress in PT. Notes max pain levels at 0/10 and average pain level of 0/10. Pt reports 80% improvement since beginning therapy. Pt notes she has been avoiding putting excess stress on the R shoulder, but does report she went kayaking last week and had no issues. Pt notes they are completing their HEP at least once a day. Pt will continue to benefit from skilled PT to progress exercises and improve upon R shoulder stability and ROM. Goal/Measure of Progress Goal Met? 1.  Pt to report 75% improvement in sleeping to improve QoL. Status at last Eval: Goal established Current Status: 80% yes   2. Pt to report decreased max pain levels less than 2/10 for improvement in ADLs. Status at last Eval: 3/10 Current Status: 0/10 yes   3. Improve FOTO score to 70/100 to show a significant functional change. Status at last Eval: 62 Current Status: 76 yes     New Goals to be achieved in __2__  treatments:  2. Pt to report return to gym with UE therex to resume PLOF. 3. Pt to be I with progressive HEP in preparation for DC. RECOMMENDATIONS  Continue with therapy for 2 more visits in order to instruct in I HEP and further gym activities after DC. Please advise. Thank you.   If you have any questions/comments please contact us directly at [phone]. Thank you for allowing us to assist in the care of your patient. Therapist Signature: Debo Apodaca PT, DPT Date: 7/21/2022     Time: 10:08 AM   NOTE TO PHYSICIAN:  PLEASE COMPLETE THE ORDERS BELOW AND FAX TO   [facility] Physical [facility]: [phone]  If you are unable to process this request in 24 hours please contact our office: [phone]    ___ I have read the above report and request that my patient continue as recommended.   ___ I have read the above report and request that my patient continue therapy with the following changes/special instructions:_________________________________________________________   ___ I have read the above report and request that my patient be discharged from therapy.      Physician Signature:        Date:       Time:                                 Marjorie Thacker MD

## 2022-07-21 NOTE — PROGRESS NOTES
PHYSICAL THERAPY - DAILY TREATMENT NOTE    Patient Name: Herbert Levin        Date: 2022  : 1980   yes Patient  Verified  Visit #:   10   of   12  Insurance: Payor: Shannan Lowery / Plan: Dary Hayden / Product Type: PPO /      In time: 6:00 pm Out time: 6:53   Total Treatment Time: 53     Medicare/[employer] Time Tracking (below)   Total Timed Codes (min):  53 1:1 Treatment Time:  53     TREATMENT AREA =  Pain in right shoulder [M25.511]    SUBJECTIVE  Pain Level (on 0 to 10 scale):  0  / 10   Medication Changes/New allergies or changes in medical history, any new surgeries or procedures?    no  If yes, update Summary List   Subjective Functional Status/Changes:  []  No changes reported     Notes she doesn't have many thoughts about her shoulder right now. OBJECTIVE  25 min Therapeutic Exercise:  [x]  See flow sheet   Rationale:      increase ROM and increase strength to improve the patients ability to maintain upright posture, reach, lift and carry. 10 min Manual Therapy: TPR along infraspinatus and lat   Rationale:      decrease pain, increase ROM and increase tissue extensibility to improve patient's ability to maintain upright posture, reach, lift and carry. The manual therapy interventions were performed at a separate and distinct time from the therapeutic activities interventions. 18 min Self Care: Discussion about POC, pending DC next week; Instructed to go to gym and use HEP as warm up-see what is bothersome, but don't go super heavy. Reassessment of goals and pt education about condition   Rationale:     pt education  to improve the patients ability to prepare for DC    Billed With/As:   [x] TE   [] TA   [] Neuro   [] Self Care Patient Education: [x] Review HEP    [] Progressed/Changed HEP based on:   [] positioning   [] body mechanics   [] transfers   [] heat/ice application    [] other:      Other Objective/Functional Measures:   TrPs noted along infraspinatus Therex and NMR as per flow sheet. Post Treatment Pain Level (on 0 to 10) scale:   0  / 10     ASSESSMENT  Assessment/Changes in Function:     See PN     [x]  See Progress Note/Recertification   Patient will continue to benefit from skilled PT services to modify and progress therapeutic interventions, address functional mobility deficits, address ROM deficits, address strength deficits, analyze and address soft tissue restrictions, analyze and cue movement patterns, analyze and modify body mechanics/ergonomics and assess and modify postural abnormalities to attain remaining goals. Progress toward goals / Updated goals:    Patient making good progress towards all goals at this time. Pain remains low to none. Strength slowly improving. Progressed to single arm therex. All goals remain applicable.      PLAN  [x]  Upgrade activities as tolerated yes Continue plan of care   []  Discharge due to :    []  Other:      Therapist: Newton Stafford PT    Date: 7/21/2022 Time: 6:53 PM     Future Appointments   Date Time Provider Danyel Hatch   7/21/2022  6:00 PM Jona Oneal PT [facility]   7/25/2022  6:00 PM Jose M Isbell, PT [facility]   7/28/2022  6:00 PM Jona Oneal, PT [facility]

## 2022-07-25 ENCOUNTER — HOSPITAL ENCOUNTER (OUTPATIENT)
Dept: PHYSICAL THERAPY | Age: 42
Discharge: HOME | End: 2022-07-25
Payer: COMMERCIAL

## 2022-07-25 PROCEDURE — 97112 NEUROMUSCULAR REEDUCATION: CPT

## 2022-07-25 PROCEDURE — 97140 MANUAL THERAPY 1/> REGIONS: CPT

## 2022-07-25 PROCEDURE — 97110 THERAPEUTIC EXERCISES: CPT

## 2022-07-25 NOTE — PROGRESS NOTES
PHYSICAL THERAPY - DAILY TREATMENT NOTE    Patient Name: Christie Hudson        Date: 2022  : 1980   yes Patient  Verified  Visit #:     Insurance: Payor: [employer] / Plan: [address] [place] [address] / Product Type: PPO /      In time: 6:05 pm Out time: 7:00 pm   Total Treatment Time: 55     Medicare/[employer] Time Tracking (below)   Total Timed Codes (min):  45 1:1 Treatment Time:  45     TREATMENT AREA =  Pain in right shoulder [M25.511]    SUBJECTIVE  Pain Level (on 0 to 10 scale):  0  / 10   Medication Changes/New allergies or changes in medical history, any new surgeries or procedures?    no  If yes, update Summary List   Subjective Functional Status/Changes:  []  No changes reported     Patient reports she was able to help her neighbor demo their shed and move a swing set with no issues for the shoulder. OBJECTIVE  Modalities Rationale:     decrease inflammation and decrease pain to improve patient's ability to reach, lift, carry, push and pull weight.   min [] Estim, type/location:                                      []  att     []  unatt     []  w/US     []  w/ice    []  w/heat    min []  Mechanical Traction: type/lbs                   []  pro   []  sup   []  int   []  cont    []  before manual    []  after manual    min []  Ultrasound, settings/location:      min []  Iontophoresis w/ dexamethasone, location:                                               []  take home patch       []  in clinic   10 min [x]  Ice     []  Heat    location/position: Prone with bolster at ankles.  CP to post cuff.    min []  Vasopneumatic Device, press/temp:    If using vaso (only need to measure limb vaso being performed on)      pre-treatment girth :       post-treatment girth :       measured at (landmark location) :      min []  Other:    [] Skin assessment post-treatment (if applicable):    []  intact    []  redness- no adverse reaction                  []redness - adverse reaction:      15 min Therapeutic Exercise:  [x]  See flow sheet   Rationale:      increase ROM and increase strength to improve the patients ability to reach, lift, carry, push and pull weight. 15 min Manual Therapy: Post Cuff, UT, rhomboids, lats STM/DTM   Rationale:      decrease pain, increase ROM, increase tissue extensibility, decrease trigger points, and increase postural awareness to improve patient's ability to reach, lift, carry, push and pull weight. The manual therapy interventions were performed at a separate and distinct time from the therapeutic activities interventions. 15 min Neuromuscular Re-ed: [x]  See flow sheet   Rationale:    increase strength, improve coordination, and increase proprioception to improve the patients ability to reach, lift, carry, push and pull weight. Billed With/As:   [x] TE   [] TA   [] Neuro   [] Self Care Patient Education: [x] Review HEP    [] Progressed/Changed HEP based on:   [] positioning   [] body mechanics   [] transfers   [] heat/ice application    [] other:      Other Objective/Functional Measures:    Verbal or Tactile cues required: for form and positioning for pec fly at [address] [place] [address]. Posture/positioning: slight rounded shoulder  ROM: WNL  Palpation: no TTP. Therex and NMR as per flow sheet. Post Treatment Pain Level (on 0 to 10) scale:   0  / 10     ASSESSMENT  Assessment/Changes in Function:     Patient tolerated today's treatment well. Progressed/added therex as follows: no progressions due to soreness. Discussed progress with patient. Patient is agreeable. Continue to progress as tolerated.      []  See Progress Note/Recertification   Patient will continue to benefit from skilled PT services to modify and progress therapeutic interventions, address functional mobility deficits, address ROM deficits, address strength deficits, analyze and address soft tissue restrictions, analyze and cue movement patterns, analyze and modify body mechanics/ergonomics, and assess and modify postural abnormalities to attain remaining goals. Progress toward goals / Updated goals:    Patient making good progress towards all goals at this time. Plan for d/c next visit. All goals remain applicable.      PLAN  [x]  Upgrade activities as tolerated yes Continue plan of care   []  Discharge due to :    []  Other:      Therapist: Lauris Epley, PT    Date: 7/25/2022 Time: 7:13 AM     Future Appointments   Date Time Provider Danyel Hatch   7/25/2022  6:00 PM Ganesh Foster [facility]   7/28/2022  6:00 PM Dee Dee Fernandez PT [facility]

## 2022-07-28 ENCOUNTER — HOSPITAL ENCOUNTER (OUTPATIENT)
Dept: PHYSICAL THERAPY | Age: 42
Discharge: HOME | End: 2022-07-28
Payer: COMMERCIAL

## 2022-07-28 PROCEDURE — 97535 SELF CARE MNGMENT TRAINING: CPT

## 2022-07-28 PROCEDURE — 97110 THERAPEUTIC EXERCISES: CPT

## 2022-07-28 NOTE — PROGRESS NOTES
PHYSICAL THERAPY - DAILY TREATMENT NOTE    Patient Name: Rogelio Perkins        Date: 2022  : 1980   yes Patient  Verified  Visit #:   15   of   13  Insurance: Payor: Carlos Alberto Manzo / Plan: [address] [place] [address] / Product Type: PPO /      In time: 6:05 pm Out time: 6:44 pm   Total Treatment Time: 39     Medicare/[facility] Time Tracking (below)   Total Timed Codes (min):  39 1:1 Treatment Time:  23     TREATMENT AREA =  Pain in right shoulder [M25.511]    SUBJECTIVE  Pain Level (on 0 to 10 scale):  0  / 10   Medication Changes/New allergies or changes in medical history, any new surgeries or procedures?    no  If yes, update Summary List   Subjective Functional Status/Changes:  []  No changes reported     Notes she whisked a vinaigrette last night and felt pain in her shoulder. OBJECTIVE  29  (13min 1:1) min Therapeutic Exercise:  [x]  See flow sheet   Rationale:      increase ROM and increase strength to improve the patients ability to reach, lift, carry, push and pull weight. 10 min Self Care: Pt education about activity modification, return to gym and Updated HEP   Rationale:     Pt education  to improve the patients ability to continue progress made with PT.       Billed With/As:   [x] TE   [] TA   [] Neuro   [x] Self Care Patient Education: [x] Review HEP    [] Progressed/Changed HEP based on:   [] positioning   [] body mechanics   [] transfers   [] heat/ice application    [] other:      Other Objective/Functional Measures:    See DC   Post Treatment Pain Level (on 0 to 10) scale:   0  / 10     ASSESSMENT  Assessment/Changes in Function:     See DC     []  See Progress Note/Recertification   Patient will continue to benefit from skilled PT services to modify and progress therapeutic interventions, address functional mobility deficits, address ROM deficits, address strength deficits, analyze and address soft tissue restrictions, analyze and cue movement patterns, analyze and modify body mechanics/ergonomics, and assess and modify postural abnormalities to attain remaining goals.    Progress toward goals / Updated goals:    See DC     PLAN  []  Upgrade activities as tolerated No Continue plan of care   [x]  Discharge due to : Meeting all goals   []  Other:      Therapist: Jose Carlos Brown PT    Date: 7/28/2022 Time: 7:13 PM     Future Appointments   Date Time Provider Danyel Hatch   7/28/2022  6:00 PM Constance Smith PT [facility]

## 2022-07-28 NOTE — PROGRESS NOTES
201 [facility] PHYSICAL THERAPY  [address] [place] [address] [address],[facility], [facility] - Phone: [phone]  Fax: [phone]  DISCHARGE NOTE  Patient Name: Matt Mejias : 1980   Treatment/Medical Diagnosis: Pain in right shoulder [M25.511]   Referral Source: Génesis Thakur MD     Date of Initial Visit: 22 Attended Visits: 13 Missed Visits: 0     SUMMARY OF TREATMENT  Pt was seen for IE and 12 f/u visits with treatment consisting of manual therapy, therapeutic exercises, therapeutic activities, neuromuscular reeducation, and self care techniques to improve R shoulder stability and strength along with instruction of HEP. CURRENT STATUS  Pt was seen twice after last PN on . Below are comments from last PN. She will be DC at this time due to meeting all goals and having a good understanding of HEP. Pt has made gradual progress in PT. Notes max pain levels at 0/10 and average pain level of 0/10. Pt reports 80% improvement since beginning therapy. Pt notes she has been avoiding putting excess stress on the R shoulder, but does report she went kayaking last week and had no issues. Pt notes they are completing their HEP at least once a day. Goal/Measure of Progress Goal Met? 1.  Pt to be I with progressive HEP in preparation for DC. Status at last Eval: Goal established Current Status: yes yes   2. Pt to report return to gym with UE therex to resume PLOF. Status at last Eval: Goal established Current Status:  workouts with no pain yes                RECOMMENDATIONS  DC pt at this time due to meeting all goals and being I with HEP. If you have any questions/comments please contact us directly at [phone]. Thank you for allowing us to assist in the care of your patient.     Therapist Signature: Dhiraj Vallejo PT, DPT Date: 2022     Time: 10:08 AM   NOTE TO PHYSICIAN:  PLEASE COMPLETE THE ORDERS BELOW AND FAX TO   [facility]: [phone]  If you are unable to process this request in 24 hours please contact our office: [phone]    ___ I have read the above report and request that my patient continue as recommended.   ___ I have read the above report and request that my patient continue therapy with the following changes/special instructions:_________________________________________________________   ___ I have read the above report and request that my patient be discharged from therapy.      Physician Signature:        Date:       Time:                                 Yobani Mooney MD